# Patient Record
Sex: MALE | Race: BLACK OR AFRICAN AMERICAN | Employment: OTHER | URBAN - METROPOLITAN AREA
[De-identification: names, ages, dates, MRNs, and addresses within clinical notes are randomized per-mention and may not be internally consistent; named-entity substitution may affect disease eponyms.]

---

## 2017-02-21 ENCOUNTER — HOSPITAL ENCOUNTER (EMERGENCY)
Age: 57
Discharge: HOME | End: 2017-02-21
Attending: EMERGENCY MEDICINE | Admitting: EMERGENCY MEDICINE
Payer: OTHER GOVERNMENT

## 2017-02-21 VITALS
WEIGHT: 260 LBS | DIASTOLIC BLOOD PRESSURE: 64 MMHG | BODY MASS INDEX: 37.22 KG/M2 | TEMPERATURE: 97.2 F | HEIGHT: 70 IN | OXYGEN SATURATION: 100 % | SYSTOLIC BLOOD PRESSURE: 132 MMHG | HEART RATE: 57 BPM | RESPIRATION RATE: 20 BRPM

## 2017-02-21 DIAGNOSIS — J01.00 ACUTE MAXILLARY SINUSITIS, RECURRENCE NOT SPECIFIED: Primary | ICD-10-CM

## 2017-02-21 PROCEDURE — 99282 EMERGENCY DEPT VISIT SF MDM: CPT

## 2017-02-21 RX ORDER — FLUTICASONE PROPIONATE 50 MCG
2 SPRAY, SUSPENSION (ML) NASAL DAILY
Qty: 1 BOTTLE | Refills: 0 | Status: SHIPPED | OUTPATIENT
Start: 2017-02-21

## 2017-02-21 RX ORDER — AMOXICILLIN AND CLAVULANATE POTASSIUM 875; 125 MG/1; MG/1
1 TABLET, FILM COATED ORAL 2 TIMES DAILY
Qty: 20 TAB | Refills: 0 | Status: SHIPPED | OUTPATIENT
Start: 2017-02-21 | End: 2017-03-03

## 2017-02-21 RX ORDER — METFORMIN HYDROCHLORIDE 500 MG/1
TABLET ORAL 2 TIMES DAILY WITH MEALS
COMMUNITY

## 2017-02-21 NOTE — DISCHARGE INSTRUCTIONS
Saline Nasal Washes: Care Instructions  Your Care Instructions  Saline nasal washes help keep the nasal passages open by washing out thick or dried mucus. This simple remedy can help relieve symptoms of allergies, sinusitis, and colds. It also can make the nose feel more comfortable by keeping the mucous membranes moist. You may notice a little burning sensation in your nose the first few times you use the solution, but this usually gets better in a few days. Follow-up care is a key part of your treatment and safety. Be sure to make and go to all appointments, and call your doctor if you are having problems. It's also a good idea to know your test results and keep a list of the medicines you take. How can you care for yourself at home? · You can buy premixed saline solution in a squeeze bottle or other sinus rinse products at a drugstore. Read and follow the instructions on the label. · You also can make your own saline solution by adding 1 teaspoon of salt and 1 teaspoon of baking soda to 2 cups of distilled water. · If you use a homemade solution, pour a small amount into a clean bowl. Using a rubber bulb syringe, squeeze the syringe and place the tip in the salt water. Pull a small amount of the salt water into the syringe by relaxing your hand. · Sit down with your head tilted slightly back. Do not lie down. Put the tip of the bulb syringe or the squeeze bottle a little way into one of your nostrils. Gently drip or squirt a few drops into the nostril. Repeat with the other nostril. Some sneezing and gagging are normal at first.  · Gently blow your nose. · Wipe the syringe or bottle tip clean after each use. · Repeat this 2 or 3 times a day. · Use nasal washes gently if you have nosebleeds often. When should you call for help? Watch closely for changes in your health, and be sure to contact your doctor if:  · You often get nosebleeds. · You have problems doing the nasal washes.   Where can you learn more? Go to http://spike-chadwick.info/. Enter [id number] in the search box to learn more about \"Saline Nasal Washes: Care Instructions. \"  Current as of: July 29, 2016  Content Version: 11.1  © 5323-0314 [employer]. Care instructions adapted under license by [employer] (which disclaims liability or warranty for this information). If you have questions about a medical condition or this instruction, always ask your healthcare professional. [address] any warranty or liability for your use of this information. Sinusitis: Care Instructions  Your Care Instructions    Sinusitis is an infection of the lining of the sinus cavities in your head. Sinusitis often follows a cold. It causes pain and pressure in your head and face. In most cases, sinusitis gets better on its own in 1 to 2 weeks. But some mild symptoms may last for several weeks. Sometimes antibiotics are needed. Follow-up care is a key part of your treatment and safety. Be sure to make and go to all appointments, and call your doctor if you are having problems. It's also a good idea to know your test results and keep a list of the medicines you take. How can you care for yourself at home? · Take an over-the-counter pain medicine, such as acetaminophen (Tylenol), ibuprofen (Advil, Motrin), or naproxen (Aleve). Read and follow all instructions on the label. · If the doctor prescribed antibiotics, take them as directed. Do not stop taking them just because you feel better. You need to take the full course of antibiotics. · Be careful when taking over-the-counter cold or flu medicines and Tylenol at the same time. Many of these medicines have acetaminophen, which is Tylenol. Read the labels to make sure that you are not taking more than the recommended dose. Too much acetaminophen (Tylenol) can be harmful.   · Breathe warm, moist air from a steamy shower, a hot bath, or a sink filled with hot water. Avoid cold, dry air. Using a humidifier in your home may help. Follow the directions for cleaning the machine. · Use saline (saltwater) nasal washes to help keep your nasal passages open and wash out mucus and bacteria. You can buy saline nose drops at a grocery store or drugstore. Or you can make your own at home by adding 1 teaspoon of salt and 1 teaspoon of baking soda to 2 cups of distilled water. If you make your own, fill a bulb syringe with the solution, insert the tip into your nostril, and squeeze gently. Lorn Hush your nose. · Put a hot, wet towel or a warm gel pack on your face 3 or 4 times a day for 5 to 10 minutes each time. · Try a decongestant nasal spray like oxymetazoline (Afrin). Do not use it for more than 3 days in a row. Using it for more than 3 days can make your congestion worse. When should you call for help? Call your doctor now or seek immediate medical care if:  · You have new or worse swelling or redness in your face or around your eyes. · You have a new or higher fever. Watch closely for changes in your health, and be sure to contact your doctor if:  · You have new or worse facial pain. · The mucus from your nose becomes thicker (like pus) or has new blood in it. · You are not getting better as expected. Where can you learn more? Go to http://spike-chadwick.info/. Enter G568 in the search box to learn more about \"Sinusitis: Care Instructions. \"  Current as of: July 29, 2016  Content Version: 11.1  © 9408-2577 [employer]. Care instructions adapted under license by [employer] (which disclaims liability or warranty for this information). If you have questions about a medical condition or this instruction, always ask your healthcare professional. [address] any warranty or liability for your use of this information.

## 2017-02-21 NOTE — LETTER
[facility] [place] 
THE [facility] EMERGENCY DEPT 
[address] [id number] [phone] Work/School Note Date: 2/21/2017 To Whom It May concern: 
 
Audrey Velázquez was seen and treated today in the emergency room by the following provider(s): 
Attending Provider: Reyna Schroeder MD 
Physician Assistant: Anoop Mao PA-C. Audrey Velázquez may return to work on 2/22/2017. Sincerely, Tess Reeves PA-C

## 2017-02-21 NOTE — ED PROVIDER NOTES
HPI Comments:   3:06 PM   Eli Theodore is a 59 y.o. male with a hx of DM (recently diagnosed), HTN, HLD, and WALTER (uses CPAP) presenting with wife the ED C/O cold/URI sxs for several days. Associated sxs include nasal congestion/pressure and generalized fatigue/lethargy. Home treatment with Afrin provided no relief of sxs. Pt denies cough, sore throat, and any other Sx or complaints. Patient is a 59 y.o. male presenting with nasal congestion. The history is provided by the patient and the spouse. No  was used. Nasal Congestion   This is a new problem. The current episode started more than 2 days ago. The problem has not changed since onset. Pertinent negatives include no chest pain, no headaches and no shortness of breath.         Past Medical History:   Diagnosis Date    Arthritis     Borderline diabetes     Borderline diabetic     Diabetes ([address]     Diabetes mellitus ([address]     Hiatal hernia     Hypercholesteremia     Hypertension     Plantar fasciitis, bilateral     Sleep apnea     Thyroid disease        Past Surgical History:   Procedure Laterality Date    Hx wrist fracture tx      Hx ankle fracture tx Left     Hx knee replacement Right          Family History:   Problem Relation Age of Onset    Diabetes Mother     Glaucoma Mother     Heart Disease Mother     Heart Disease Father     Cancer Sister     Cancer Brother      breast    Kidney Disease Brother     Diabetes Brother     Diabetes Sister     Pneumonia Sister        Social History     Social History    Marital status:      Spouse name: N/A    Number of children: 6    Years of education: 12     Occupational History     Other     Social History Main Topics    Smoking status: Former Smoker     Packs/day: 0.50     Years: 5.00     Types: Cigarettes    Smokeless tobacco: Former User     Types: Chew     Quit date: 1/1/1992    Alcohol use 0.0 oz/week     0 Standard drinks or equivalent per week    Drug use: No    Sexual activity: Yes     Partners: Female     Other Topics Concern     Service Yes      [employer]    Blood Transfusions No    Caffeine Concern No    Occupational Exposure No    Hobby Hazards Yes     Racquetball    Sleep Concern Yes     CPAP    Stress Concern No    Weight Concern Yes    Special Diet No    Back Care Yes     wears a brace as needed    Exercise Yes     walking    Bike Helmet No    Seat Belt Yes    Self-Exams No     Social History Narrative         ALLERGIES: Review of patient's allergies indicates no known allergies. Review of Systems   Constitutional: Positive for fatigue. Negative for chills and fever. HENT: Positive for congestion and sinus pressure. Negative for sore throat. Respiratory: Negative for cough and shortness of breath. Cardiovascular: Negative for chest pain. Gastrointestinal: Negative for nausea and vomiting. Musculoskeletal: Negative for myalgias. Skin: Negative for rash. Allergic/Immunologic: Negative for immunocompromised state. Neurological: Negative for dizziness, light-headedness and headaches. Hematological: Negative for adenopathy. Vitals:    02/21/17 1402   BP: 132/64   Pulse: (!) 57   Resp: 20   Temp: 97.2 °F (36.2 °C)   SpO2: 100%   Weight: 117.9 kg (260 lb)   Height: 5' 10\" (1.778 m)            Physical Exam   Constitutional: He is oriented to person, place, and time. He appears well-developed and well-nourished. No distress. Male in NAD. Alert. Appears comfortable. HENT:   Head: Normocephalic and atraumatic. Head is without right periorbital erythema and without left periorbital erythema. Right Ear: External ear normal. No drainage or swelling. Tympanic membrane is not perforated, not erythematous and not bulging. Left Ear: External ear normal. No drainage or swelling. Tympanic membrane is not perforated, not erythematous and not bulging. Nose: Mucosal edema and rhinorrhea present.  Right sinus exhibits maxillary sinus tenderness and frontal sinus tenderness. Left sinus exhibits maxillary sinus tenderness and frontal sinus tenderness. Mouth/Throat: Uvula is midline, oropharynx is clear and moist and mucous membranes are normal. No oral lesions. No trismus in the jaw. No dental abscesses or uvula swelling. No oropharyngeal exudate, posterior oropharyngeal edema, posterior oropharyngeal erythema or tonsillar abscesses. Eyes: Conjunctivae are normal.   Neck: Normal range of motion. Neck supple. Cardiovascular: Normal rate, regular rhythm, normal heart sounds and intact distal pulses. Exam reveals no gallop and no friction rub. No murmur heard. Pulmonary/Chest: Effort normal and breath sounds normal. No accessory muscle usage. No tachypnea. No respiratory distress. He has no decreased breath sounds. He has no wheezes. He has no rhonchi. He has no rales. Abdominal: Soft. Bowel sounds are normal. He exhibits no distension. There is no tenderness. Musculoskeletal: Normal range of motion. Lymphadenopathy:     He has no cervical adenopathy. Neurological: He is alert and oriented to person, place, and time. Skin: Skin is warm and dry. He is not diaphoretic. Psychiatric: He has a normal mood and affect. Judgment normal.   Nursing note and vitals reviewed. RESULTS:    No orders to display        Labs Reviewed - No data to display    No results found for this or any previous visit (from the past 12 hour(s)). MDM  Number of Diagnoses or Management Options  Acute maxillary sinusitis, recurrence not specified:   Diagnosis management comments: URI, strep, sinusitis, mono, PNA, allergic, bronchitis, influenza       Amount and/or Complexity of Data Reviewed  Obtain history from someone other than the patient: yes (Wife)      ED Course     MEDICATIONS GIVEN:  Medications - No data to display     Procedures     PROGRESS NOTE:   3:06 PM  Initial assessment performed.   Written by RUTH Aldanaibrachel, as dictated by Navdeep Wick PA-C      Afebrile. Will tx for bacterial sinusitis. Flonase. Netti pot with distilled water reviewed. FU with PCP. Reasons to RTED discussed with pt. All questions answered. Pt feels comfortable going home at this time. Pt expressed understanding and he agrees with plan. DISCHARGE NOTE:  3:26 PM   Susan [place]  results have been reviewed with him. He has been counseled regarding his diagnosis, treatment, and plan. He verbally conveys understanding and agreement of the signs, symptoms, diagnosis, treatment and prognosis and additionally agrees to follow up as discussed. He also agrees with the care-plan and conveys that all of his questions have been answered. I have also provided discharge instructions for him that include: educational information regarding their diagnosis and treatment, and list of reasons why they would want to return to the ED prior to their follow-up appointment, should his condition change. The patient and/or family have been provided with education for proper Emergency Department utilization. CLINICAL IMPRESSION:    1. Acute maxillary sinusitis, recurrence not specified        AFTER VISIT PLAN:    Discharge Medication List as of 2/21/2017  3:27 PM      START taking these medications    Details   amoxicillin-clavulanate (AUGMENTIN) 875-125 mg per tablet Take 1 Tab by mouth two (2) times a day for 10 days. Take with daily yogurt or probiotic. Take with food. Indications: ACUTE BACTERIAL SINUSITIS, Print, Disp-20 Tab, R-0      fluticasone (FLONASE) 50 mcg/actuation nasal spray 2 Sprays by Both Nostrils route daily. , Print, Disp-1 Bottle, R-0         CONTINUE these medications which have NOT CHANGED    Details   metFORMIN (GLUCOPHAGE) 500 mg tablet Take  by mouth two (2) times daily (with meals). , Historical Med      !! cpap machine kit by Does Not Apply route. CPAP at 17 cm with humidifier. Mask- Mirage Quattro full face, medium. Length of need 99 months. Replace mask and accessories as needed times 12 months. Download data at 30 days and fax at [phone]. Dx- WALTER on CPAP, Snoring,  Hypersomnolence, Print, Disp-1 Kit, R-0      !! budesonide-formoterol (SYMBICORT) 80-4.5 mcg/actuation HFAA inhaler Take 2 Puffs by inhalation two (2) times a day., Sample, Disp-1 Inhaler, R-0      !! budesonide-formoterol (SYMBICORT) 80-4.5 mcg/actuation HFAA inhaler Take 2 Puffs by inhalation two (2) times a day., Print, Disp-1 Inhaler, R-5      SIMVASTATIN PO Take 145 mg by mouth daily. , Historical Med      inhalational spacing device 1 Each by Does Not Apply route as needed. Please dispense one adult spacer to be used with albuterol HFA. , Print, Disp-1 Device, R-0      !! cpap machine kit by Does Not Apply route nightly., Historical Med      esomeprazole (NEXIUM) 20 mg capsule Take  by mouth daily. , Historical Med      CELECOXIB (CELEBREX PO) Take  by mouth., Historical Med       !! - Potential duplicate medications found. Please discuss with provider. Follow-up Information     Follow up With Details Comments Contact Info    Yoli Branch MD  As needed, If symptoms worsen [facility] [phone]      THE [facility] EMERGENCY DEPT  As needed, If symptoms worsen [address] [place] [phone]  [phone]             Attestations: This note is prepared by Nicolas Stevens, acting as Scribe for CurrentlyNEVILLE. CurrentlyNEVILLE: The scribe's documentation has been prepared under my direction and personally reviewed by me in its entirety. I confirm that the note above accurately reflects all work, treatment, procedures, and medical decision making performed by me.

## 2017-02-21 NOTE — ED NOTES
I have reviewed discharge instructions with the patient. The patient verbalized understanding. Patient armband removed and shredded  Reviewed and verified discharge instructions with patient, prescriptions in hand for Augmentin and Afrin nasal spray and work note for patient and wife. Discharged ambulatory on steady gait in NAD.

## 2017-02-21 NOTE — ED NOTES
Nasal congestion with pain behind his eyes for approximately one week, using OTC Afrin nasal spray with some relief, symptoms return, also complains of crusty like drainage from his eyes.

## 2017-02-21 NOTE — LETTER
[facility] [place] 
THE [facility] EMERGENCY DEPT 
[address] [id number] [phone] Work/School Note Date: 2/21/2017 To Whom It May concern: 
 
Diana Kasper was seen and treated today in the emergency room by the following provider(s): 
Attending Provider: Sly Hayes MD 
Physician Assistant: Jenifer Rivera PA-C. Jordyn Sawant wife may return to work on 2/22/2017. Sincerely, Ferny Contreras PA-C

## 2017-06-22 ENCOUNTER — HOSPITAL ENCOUNTER (OUTPATIENT)
Dept: PHYSICAL THERAPY | Age: 57
Discharge: HOME | End: 2017-06-22
Payer: OTHER GOVERNMENT

## 2017-06-22 PROCEDURE — 97162 PT EVAL MOD COMPLEX 30 MIN: CPT

## 2017-06-22 PROCEDURE — 97530 THERAPEUTIC ACTIVITIES: CPT

## 2017-06-22 NOTE — PROGRESS NOTES
PT DAILY TREATMENT NOTE     Patient Name: Claudell Dole  Date:2017  : 1960  [x]  Patient  Verified  Payor:  / Plan: [facility]  RETIREES AND DEPENDENTS / Product Type:  /    In time:3:25 pm  Out time: 4:05 pm  Total Treatment Time (min): 40  Visit #: 1 of 12    Treatment Area: Cervicalgia [M54.2]    SUBJECTIVE  Pain Level (0-10 scale): 5  Any medication changes, allergies to medications, adverse drug reactions, diagnosis change, or new procedure performed?: [x] No    [] Yes (see summary sheet for update)  Subjective functional status/changes:   [] No changes reported    Hx Present Illness: \"I have been having these pains in my neck for about over a year. I don't know how or why. \"  insidious in onset  Initially thought due to how was sleeping  Tried going to chiropractor - with only short lived improvements  Unsure if related to hitting head on forkift at work   Intermittent numbness/tingling and decreased  strength in Left UE  Indicates numbness in digits 2-3  Increase in numbness and tingling with lifting  Recently dx with Type II DM  X-rays - pt unaware of results  Reports previously frequent HA, has subsided in last several months  Increase in pain with turning head, sitting, lifting, daily activities, disturbing sleep at night         Pain:  _7__/10 max       __4_/10 min     _5-6___/10 now    Location: indicates c-spine, periscapular area to subocciptal region      [x] Sharp    [] Dull      [] Burning     []  Aching     [] Throbbing      [] Tingling     [] Other:       [x]  Constant                   [] Intermittent        Previous treatment:   Chiropractor    PMHX: Significant for: Please see past medical hx form     Social/Recreation/Work: warehouse- requires lifting  [employer]   [employer]     Patient Goal(s): \"Would be nice to get rid of the pain,but alleviate the pain. ROM I guess. \"      OBJECTIVE    Modality rationale:    Min Type Additional Details    [] Estim: []Unatt       []IFC  []Premod                        []Other:  []w/ice   []w/heat  Position:  Location:    [] Estim: []Att    []TENS instruct  []NMES                    []Other:  []w/US   []w/ice   []w/heat  Position:  Location:    []  Traction: [] Cervical       []Lumbar                       [] Prone          []Supine                       []Intermittent   []Continuous Lbs:  [] before manual  [] after manual    []  Ultrasound: []Continuous   [] Pulsed                           []1MHz   []3MHz W/cm2:  Location:    []  Iontophoresis with dexamethasone         Location: [] Take home patch   [] In clinic    []  Ice     []  heat  []  Ice massage  []  Laser   []  Anodyne Position:  Location:    []  Laser with stim  []  Other:  Position:  Location:    []  Vasopneumatic Device Pressure:       [] lo [] med [] hi   Temperature: [] lo [] med [] hi   [] Skin assessment post-treatment:  []intact []redness- no adverse reaction    []redness - adverse reaction:     25 min [x]Eval                  []Re-Eval             With   [] TE   [x] TA - 15 min    [] neuro   [] other: Patient Education: [x] Review HEP    [] Progressed/Changed HEP based on:   [] positioning   [] body mechanics   [] transfers   [] heat/ice application    [x] other: reviewed exam findings, anatomy involved, POC     Other Objective/Functional Measures: Movement/gait:      Visual Inspection: Thoracic: flattened  Lumbar: increased   Shoulder/Scapula: right shoulder and scap lower, bilateral scap abducted   Forward head, rounded shoulders     Palpation: restricted cervical mobility, especially with sideglides to the left  Decreased thoracic and rib mobility  pec and lat stiffness bilaterally   TTP and active trigger points in bilateraly UT, cervical paraspinals.   Decreased OA extension                              AROM/PROM Right Left   Cervical Flex: 31     Ext: 29     Rot 35 34   Lat Flex 24 23               Strength Right Left   UQS 4+ 4+        Middle trap 4 4-   Lower trap 4 4-                 Special Tests Right Left   Spurlings - -   Trunk Rot 19.5 in 19.5 in                          Other Right Left                                       Other Comments: Standing Forward Flexion fingers to floor, dcr reversal of lordosis, decreased use of gluts to stand  Gillet: hypomobility  Scapular Rhythm: slight dyskinesia, superior translation of bilateral humeral heads    Pain Level (0-10 scale) post treatment: 4    ASSESSMENT/Changes in Function:   Pt is a 59 y.o. Male with C/C of cervical pain, insidious in onset ~1 yr ago. Suspect that cervical pain is non-radicular in nature. Pt presents with forward head posture and rounded shoulders. Other objective findings include decreased cervical AROM, decreased cervical and thoracic mobility, decreased trunk rotation and decreased scapular strength. Patient will continue to benefit from skilled PT services to modify and progress therapeutic interventions, address functional mobility deficits, address ROM deficits, address strength deficits, analyze and address soft tissue restrictions, analyze and cue movement patterns, analyze and modify body mechanics/ergonomics, assess and modify postural abnormalities and instruct in home and community integration to attain remaining goals. []  See Plan of Care  []  See progress note/recertification  []  See Discharge Summary         Progress towards goals / Updated goals:  Short Term Goals: STG- To be accomplished in 2 week(s):  1. Pt will be independent with HEP to encourage prophylaxis. Eval:dispensed 2 exercises   Current: NA    Long Term Goals: LTG- To be accomplished in 6 week(s):  1. Pt will increase cervical AROM to [facility] facilitate safe driving. Eval:  AROM/PROM Right Left   Cervical Flex: 31     Ext: 29     Rot 35 34   Lat Flex 24 23     Current: NA    2. Pt will be able to lift and reach overhead at work without pain.   Eval:pain with repeated lifting and reaching at work  Current: NA    3. Pt increase trunk rotation to 16 in or less to improve mobility for increased tolerance to ADLs. Eval:19.5 in  Current: NA    4. Pt FOTO score will increase by 10 points to show improvement in function. Eval:47  Current: will address at visit 5      PLAN  [x]  Upgrade activities as tolerated     []  Continue plan of care  []  Update interventions per flow sheet       []  Discharge due to:_  []  Other:_      Bianca Hearn, PT, DPT 6/22/2017  3:23 PM    No future appointments.

## 2017-06-22 NOTE — PROGRESS NOTES
[facility] at the [address], [place], [address] [facility]  Phone: [phone]      Fax:  [phone]       Plan of Care/ Statement of Necessity for Physical Therapy Services      Patient name: Edy Mcclain Start of Care: 2017   Referral source: Dakota Mercer DO : 1960    Medical Diagnosis: Cervicalgia [M54.2]   Onset Date:~1 yr ago    Treatment Diagnosis: Cervical Pain    Prior Hospitalization: see medical history Provider#: [id number]   Medications: Verified on Patient summary List    Comorbidities: Back Pain, BMI >30, DM, Sleep Dysfunction    Prior Level of Function: Increase in pain with turning head, sitting, lifting, daily activities, disturbing sleep at night        The Plan of Care and following information is based on the information from the initial evaluation. Assessment/ key information:   Pt is a 59 y.o. Male with C/C of cervical pain, insidious in onset ~1 yr ago. Suspect that cervical pain is non-radicular in nature. Pt presents with forward head posture and rounded shoulders. Other objective findings include decreased cervical AROM, decreased cervical and thoracic mobility, decreased trunk rotation and decreased scapular strength. Evaluation Complexity History HIGH Complexity :3+ comorbidities / personal factors will impact the outcome/ POC ; Examination HIGH Complexity : 4+ Standardized tests and measures addressing body structure, function, activity limitation and / or participation in recreation  ;Presentation MEDIUM Complexity : Evolving with changing characteristics  ; Clinical Decision Making MEDIUM Complexity : FOTO score of 26-74  Overall Complexity Rating: MEDIUM  Problem List: pain affecting function, decrease ROM, decrease strength, impaired gait/ balance, decrease ADL/ functional abilitiies, decrease activity tolerance and decrease flexibility/ joint mobility   Treatment Plan may include any combination of the following: Therapeutic exercise, Therapeutic activities, Neuromuscular re-education, Physical agent/modality, Gait/balance training, Manual therapy and Patient education  Patient / Family readiness to learn indicated by: asking questions, trying to perform skills and interest  Persons(s) to be included in education: patient (P)  Barriers to Learning/Limitations: None  Patient Goal (s): Would be nice to get rid of the pain,but alleviate the pain. ROM I guess  Patient Self Reported Health Status: fair  Rehabilitation Potential: good    Short Term Goals: STG- To be accomplished in 2 week(s):  1. Pt will be independent with HEP to encourage prophylaxis. Eval:dispensed 2 exercises   Current: NA     Long Term Goals: LTG- To be accomplished in 6 week(s):  1. Pt will increase cervical AROM to [facility] facilitate safe driving. Eval:  AROM/PROM Right Left   Cervical Flex: 31       Ext: 29       Rot 35 34   Lat Flex 24 23      Current: NA     2.  Pt will be able to lift and reach overhead at work without pain. Eval:pain with repeated lifting and reaching at work  Current: NA     3.  Pt increase trunk rotation to 16 in or less to improve mobility for increased tolerance to ADLs. Eval:19.5 in  Current: NA     4. Pt FOTO score will increase by 10 points to show improvement in function. Eval:47  Current: will address at visit 5    Frequency / Duration: Patient to be seen 2 times per week for 6 weeks. Patient/ Caregiver education and instruction: Diagnosis, prognosis, self care, activity modification and exercises   [x]  Plan of care has been reviewed with JAIDEN Hearn, PT, DPT 6/22/2017 6:59 PM  _____________________________________________________________________  I certify that the above Therapy Services are being furnished while the patient is under my care. I agree with the treatment plan and certify that this therapy is necessary.     Physician's Signature:____________________ Date:__________Time:______    Please sign and return to [facility] at the [address], [place], [address] [facility]       Phone: [phone]      Fax:  [phone]

## 2017-07-05 ENCOUNTER — HOSPITAL ENCOUNTER (OUTPATIENT)
Dept: PHYSICAL THERAPY | Age: 57
Discharge: HOME | End: 2017-07-05
Payer: OTHER GOVERNMENT

## 2017-07-05 PROCEDURE — 97110 THERAPEUTIC EXERCISES: CPT

## 2017-07-05 PROCEDURE — 97140 MANUAL THERAPY 1/> REGIONS: CPT

## 2017-07-05 NOTE — PROGRESS NOTES
PT DAILY TREATMENT NOTE     Patient Name: Leena Herman  Date:2017  : 1960  [x]  Patient  Verified  Payor:  / Plan: [facility]  RETIREES AND DEPENDENTS / Product Type: [employer] /    In time:5:29  Out time:6:29  Total Treatment Time (min): 60  Visit #: 2 of 12    Treatment Area: Cervicalgia [M54.2]    SUBJECTIVE  Pain Level (0-10 scale): 10  Any medication changes, allergies to medications, adverse drug reactions, diagnosis change, or new procedure performed?: [x] No    [] Yes (see summary sheet for update)  Subjective functional status/changes:   [] No changes reported  \"I get HAs daily. \"    OBJECTIVE    Modality rationale: decrease pain to improve the patients ability to tolerate positions and ADLs.    Min Type Additional Details    [] Estim:  []Unatt       []IFC  []Premod                        []Other:  []w/ice   []w/heat  Position:  Location:    [] Estim: []Att    []TENS instruct  []NMES                    []Other:  []w/US   []w/ice   []w/heat  Position:  Location:    []  Traction: [] Cervical       []Lumbar                       [] Prone          []Supine                       []Intermittent   []Continuous Lbs:  [] before manual  [] after manual    []  Ultrasound: []Continuous   [] Pulsed                           []1MHz   []3MHz W/cm2:  Location:    []  Iontophoresis with dexamethasone         Location: [] Take home patch   [] In clinic   10 []  Ice     [x]  heat  []  Ice massage  []  Laser   []  Anodyne Position: supine  Location: applied to neck    []  Laser with stim  []  Other:  Position:  Location:    []  Vasopneumatic Device Pressure:       [] lo [] med [] hi   Temperature: [] lo [] med [] hi   [] Skin assessment post-treatment:  []intact []redness- no adverse reaction    []redness - adverse reaction:      min []Eval                  []Re-Eval       31 min Therapeutic Exercise:  [x] See flow sheet :  Added pec stretches, UBE (level 1), standing latissimus stretches (GUERRERO), added quadriped serratus anterior punches    Rationale: increase ROM, increase strength, improve coordination and increase proprioception to improve the patients ability to tolerate positions and normalize ADLs     min Therapeutic Activity:  []  See flow sheet :         min Neuromuscular Re-education:  []  See flow sheet :       19 min Manual Therapy:    Trigger point release B neck paraspinals, B UT, B levator scapula; suboccipital release   Rationale: decrease pain, increase ROM, increase tissue extensibility and decrease trigger points to move head to drive safely. min Gait Training:  ___ feet with ___ device on level surfaces with ___ level of assist   Rationale: With   [] TE   [] TA   [] neuro   [] other: Patient Education: [x] Review HEP    [] Progressed/Changed HEP based on:   [] positioning   [] body mechanics   [] transfers   [] heat/ice application    [] other:      Other Objective/Functional Measures:   Trigger point release B neck paraspinals, B UT, B levator scapula  1-2+TTP B suboccipital region     Pain Level (0-10 scale) post treatment: 4/10    ASSESSMENT/Changes in Function:    Trigger points slowly resolving in the areas treated today. No change in reported pain today, but pt reported improved AROM and less stiffness in the neck. Patient will continue to benefit from skilled PT services to modify and progress therapeutic interventions, address functional mobility deficits, address ROM deficits, address strength deficits, analyze and address soft tissue restrictions, analyze and cue movement patterns, analyze and modify body mechanics/ergonomics, assess and modify postural abnormalities and instruct in home and community integration to attain remaining goals. []  See Plan of Care  []  See progress note/recertification  []  See Discharge Summary         Progress towards goals / Updated goals:  Short Term Goals: STG- To be accomplished in 2 week(s):  1.   Pt will be independent with HEP to encourage prophylaxis. Eval:dispensed 2 exercises   Current: NA     Long Term Goals: LTG- To be accomplished in 6 week(s):  1. Pt will increase cervical AROM to Health system facilitate safe driving. Eval:  AROM/PROM Right Left   Cervical Flex: 31       Ext: 29       Rot 35 34   Lat Flex 24 23      Current: NA     2.  Pt will be able to lift and reach overhead at work without pain. Eval:pain with repeated lifting and reaching at work  Current: NA     3.  Pt increase trunk rotation to 16 in or less to improve mobility for increased tolerance to ADLs. Eval:19.5 in  Current: NA     4. Pt FOTO score will increase by 10 points to show improvement in function.   Eval:47  Current: will address at visit 5    PLAN  []  Upgrade activities as tolerated     []  Continue plan of care  []  Update interventions per flow sheet       []  Discharge due to:_  []  Other:_      Sil Fletcher, PT 7/5/2017  5:38 PM    Future Appointments  Date Time Provider [place]   7/6/2017 5:00 PM Travis Cowan, PT, DPT [facility]

## 2017-07-06 ENCOUNTER — HOSPITAL ENCOUNTER (OUTPATIENT)
Dept: PHYSICAL THERAPY | Age: 57
Discharge: HOME | End: 2017-07-06
Payer: OTHER GOVERNMENT

## 2017-07-06 PROCEDURE — 97110 THERAPEUTIC EXERCISES: CPT

## 2017-07-06 PROCEDURE — 97140 MANUAL THERAPY 1/> REGIONS: CPT

## 2017-07-06 NOTE — PROGRESS NOTES
PT DAILY TREATMENT NOTE     Patient Name: Cinthya Hernandez  Date:2017  : 1960  [x]  Patient  Verified  Payor:  / Plan: [facility]  RETIREES AND DEPENDENTS / Product Type:  /    In time: 5:05 pm  Out time:5:07 pm  Total Treatment Time (min): 58  Visit #: 3 of 12    Treatment Area: Cervicalgia [M54.2]    SUBJECTIVE  Pain Level (0-10 scale): 4  Any medication changes, allergies to medications, adverse drug reactions, diagnosis change, or new procedure performed?: [x] No    [] Yes (see summary sheet for update)  Subjective functional status/changes:   [] No changes reported  \"I felt like I could move my head a little bit more. \"    OBJECTIVE    50 min Therapeutic Exercise:  [x] See flow sheet :   Rationale: increase ROM, increase strength, improve coordination and increase proprioception to improve the patients ability to perform daily activities with decreased pain and symptom levels    12 min Manual Therapy:  STM to bilateral UT and cervical paraspinals  Manual cervical distraction and SOR   Rationale: decrease pain, increase ROM, increase tissue extensibility and decrease trigger points to perform daily activities with decreased pain and symptom levels            With   [] TE   [] TA   [] neuro   [] other: Patient Education: [x] Review HEP    [] Progressed/Changed HEP based on:   [] positioning   [] body mechanics   [] transfers   [] heat/ice application    [] other:      Other Objective/Functional Measures:   Reported improved mobility at end of session      Pain Level (0-10 scale) post treatment: 4-5 \"hurts but feels good at the same time. \"    ASSESSMENT/Changes in Function:   Pt tolerated treatment session well. Reported improved mobility at end of session. Corrected form with lat stretch.      Patient will continue to benefit from skilled PT services to modify and progress therapeutic interventions, address functional mobility deficits, address ROM deficits, address strength deficits, analyze and address soft tissue restrictions, analyze and cue movement patterns, analyze and modify body mechanics/ergonomics, assess and modify postural abnormalities and instruct in home and community integration to attain remaining goals. []  See Plan of Care  []  See progress note/recertification  []  See Discharge Summary         Progress towards goals / Updated goals:  Short Term Goals: STG- To be accomplished in 2 week(s):  1.  Pt will be independent with HEP to encourage prophylaxis. Eval:dispensed 2 exercises   Current: updated this session      Long Term Goals: LTG- To be accomplished in 6 week(s):  1.  Pt will increase cervical AROM to [facility] facilitate safe driving. Eval:  AROM/PROM Right Left   Cervical Flex: 31         Ext: 29         Rot 35 34   Lat Flex 24 23       Current: NA      2.  Pt will be able to lift and reach overhead at work without pain. Eval:pain with repeated lifting and reaching at work  Current: NA      3.  Pt increase trunk rotation to 16 in or less to improve mobility for increased tolerance to ADLs. Eval:19.5 in  Current: NA      4.  Pt FOTO score will increase by 10 points to show improvement in function. Eval:47  Current: will address at visit 5      PLAN  [x]  Upgrade activities as tolerated     []  Continue plan of care  []  Update interventions per flow sheet       []  Discharge due to:_  []  Other:_      Serafin Danielson, PT, DPT 7/6/2017  5:07 PM    No future appointments.

## 2017-08-08 ENCOUNTER — APPOINTMENT (OUTPATIENT)
Dept: PHYSICAL THERAPY | Age: 57
End: 2017-08-08

## 2019-12-03 ENCOUNTER — APPOINTMENT (OUTPATIENT)
Dept: GENERAL RADIOLOGY | Age: 59
End: 2019-12-03
Attending: PHYSICIAN ASSISTANT
Payer: OTHER GOVERNMENT

## 2019-12-03 ENCOUNTER — HOSPITAL ENCOUNTER (EMERGENCY)
Age: 59
Discharge: HOME | End: 2019-12-03
Attending: EMERGENCY MEDICINE
Payer: OTHER GOVERNMENT

## 2019-12-03 VITALS
RESPIRATION RATE: 20 BRPM | HEIGHT: 70 IN | HEART RATE: 76 BPM | TEMPERATURE: 97.5 F | OXYGEN SATURATION: 100 % | DIASTOLIC BLOOD PRESSURE: 84 MMHG | SYSTOLIC BLOOD PRESSURE: 138 MMHG | BODY MASS INDEX: 39.37 KG/M2 | WEIGHT: 275 LBS

## 2019-12-03 DIAGNOSIS — Z72.0 TOBACCO USE: ICD-10-CM

## 2019-12-03 DIAGNOSIS — J20.9 ACUTE BRONCHITIS WITH BRONCHOSPASM: Primary | ICD-10-CM

## 2019-12-03 LAB — S PYO AG THROAT QL: NEGATIVE

## 2019-12-03 PROCEDURE — 87070 CULTURE OTHR SPECIMN AEROBIC: CPT

## 2019-12-03 PROCEDURE — 99283 EMERGENCY DEPT VISIT LOW MDM: CPT

## 2019-12-03 PROCEDURE — 71046 X-RAY EXAM CHEST 2 VIEWS: CPT

## 2019-12-03 PROCEDURE — 87880 STREP A ASSAY W/OPTIC: CPT

## 2019-12-03 RX ORDER — ALBUTEROL SULFATE 90 UG/1
2 AEROSOL, METERED RESPIRATORY (INHALATION)
Qty: 1 INHALER | Refills: 1 | Status: SHIPPED | OUTPATIENT
Start: 2019-12-03

## 2019-12-03 RX ORDER — BENZONATATE 100 MG/1
100 CAPSULE ORAL
Qty: 21 CAP | Refills: 0 | Status: SHIPPED | OUTPATIENT
Start: 2019-12-03 | End: 2019-12-10

## 2019-12-03 RX ORDER — CODEINE PHOSPHATE AND GUAIFENESIN 10; 100 MG/5ML; MG/5ML
5 SOLUTION ORAL
Qty: 160 ML | Refills: 0 | Status: SHIPPED | OUTPATIENT
Start: 2019-12-03 | End: 2019-12-08

## 2019-12-03 NOTE — ED PROVIDER NOTES
EMERGENCY DEPARTMENT HISTORY AND PHYSICAL EXAM    Date: 12/3/2019  Patient Name: Francisco J Cobb    History of Presenting Illness     Chief Complaint   Patient presents with    Nasal Congestion    Cough         History Provided By: Patient    Chief Complaint: cough    HPI(Context):   9:22 AM  Francisco J Cobb is a 62 y.o. male with PMHX of OA, DMII, HTN, WALTER who presents to the emergency department C/O cough. Associated sxs include congestion and sore throat. + strep exposure. Mildly productive cough. Pt is nonsmoker. No hx of asthma or COPD. No relief with OTC meds. Pt denies fever, chills, chest pain, SOB, wheezing, and any other sxs or complaints. Pt is intermittent tobacco smoker. PCP: [place], MD [place]    Current Outpatient Medications   Medication Sig Dispense Refill    guaiFENesin-codeine (ROBITUSSIN AC) 100-10 mg/5 mL solution Take 5 mL by mouth three (3) times daily as needed for Cough for up to 5 days. Max Daily Amount: 15 mL. 160 mL 0    benzonatate (TESSALON PERLES) 100 mg capsule Take 1 Cap by mouth three (3) times daily as needed for Cough for up to 7 days. 21 Cap 0    loratadine-pseudoephedrine (CLARITIN-D 12 HOUR) 5-120 mg per tablet Take 1 Tab by mouth two (2) times a day. For nasal congestion 20 Tab 0    inhalational spacing device 1 Each by Does Not Apply route as needed (to be used with albuterol HFA.). Please dispense one adult spacer 1 Device 0    albuterol (PROVENTIL HFA, VENTOLIN HFA, PROAIR HFA) 90 mcg/actuation inhaler Take 2 Puffs by inhalation every four (4) hours as needed for Wheezing or Shortness of Breath. 1 Inhaler 1    metFORMIN (GLUCOPHAGE) 500 mg tablet Take  by mouth two (2) times daily (with meals).  fluticasone (FLONASE) 50 mcg/actuation nasal spray 2 Sprays by Both Nostrils route daily. 1 Bottle 0    cpap machine kit by Does Not Apply route. CPAP at 17 cm with humidifier. Mask- Mirage Quattro full face, medium. Length of need 99 months.  Replace mask and accessories as needed times 12 months. Download data at 30 days and fax at [phone]. Dx- WALTER on CPAP, Snoring, Hypersomnolence 1 Kit 0    budesonide-formoterol (SYMBICORT) 80-4.5 mcg/actuation HFAA inhaler Take 2 Puffs by inhalation two (2) times a day. 1 Inhaler 0    budesonide-formoterol (SYMBICORT) 80-4.5 mcg/actuation HFAA inhaler Take 2 Puffs by inhalation two (2) times a day. 1 Inhaler 5    SIMVASTATIN PO Take 145 mg by mouth daily.  cpap machine kit by Does Not Apply route nightly.  esomeprazole (NEXIUM) 20 mg capsule Take  by mouth daily.  CELECOXIB (CELEBREX PO) Take  by mouth. Past History     Past Medical History:  Past Medical History:   Diagnosis Date    Arthritis     Borderline diabetes     Borderline diabetic     Diabetes ([address]     Diabetes mellitus [address] [address]     Hiatal hernia     Hypercholesteremia     Hypertension     Plantar fasciitis, bilateral     Sleep apnea     Thyroid disease        Past Surgical History:  Past Surgical History:   Procedure Laterality Date    HX ANKLE FRACTURE TX Left     HX KNEE REPLACEMENT Right     HX WRIST FRACTURE TX         Family History:  Family History   Problem Relation Age of Onset   [address] [facility] Diabetes Mother    [address] [facility] Glaucoma Mother     Heart Disease Mother     Heart Disease Father     Cancer Sister     Cancer Brother         breast    Kidney Disease Brother     Diabetes Brother     Diabetes Sister     Pneumonia Sister        Social History:  Social History     Tobacco Use    Smoking status: Former Smoker     Packs/day: 0.50     Years: 5.00     Pack years: 2.50     Types: Cigarettes    Smokeless tobacco: Former User     Types: Chew     Quit date: 1/1/1992   Substance Use Topics    Alcohol use: Yes     Alcohol/week: 0.0 standard drinks    Drug use: No       Allergies:  No Known Allergies      Review of Systems   Review of Systems   Constitutional: Negative for chills and fever. HENT: Positive for congestion, sinus pressure and sore throat. Respiratory: Positive for cough. Negative for shortness of breath. Cardiovascular: Negative for chest pain. Skin: Negative for rash. Allergic/Immunologic: Negative for immunocompromised state. All other systems reviewed and are negative. Physical Exam     Vitals:    12/03/19 0913   BP: 138/84   Pulse: 76   Resp: 20   Temp: 97.5 °F (36.4 °C)   SpO2: 100%   Weight: 124.7 kg (275 lb)   Height: 5' 10\" (1.778 m)     Physical Exam  Vitals signs and nursing note reviewed. Constitutional:       General: He is not in acute distress. Appearance: He is well-developed. He is not diaphoretic. Comments: AA female in NAD. Alert. Appears comfortable. HENT:      Head: Normocephalic and atraumatic. No right periorbital erythema or left periorbital erythema. Jaw: No trismus. Right Ear: External ear normal. No drainage or swelling. Tympanic membrane is not perforated, erythematous or bulging. Left Ear: External ear normal. No drainage or swelling. Tympanic membrane is not perforated, erythematous or bulging. Nose: Mucosal edema present. No rhinorrhea. Right Sinus: No maxillary sinus tenderness or frontal sinus tenderness. Left Sinus: No maxillary sinus tenderness or frontal sinus tenderness. Mouth/Throat:      Mouth: No oral lesions. Dentition: No dental abscesses. Pharynx: Uvula midline. Posterior oropharyngeal erythema present. No oropharyngeal exudate or uvula swelling. Tonsils: No tonsillar abscesses. Eyes:      General: No scleral icterus. Right eye: No discharge. Left eye: No discharge. Conjunctiva/sclera: Conjunctivae normal.   Neck:      Musculoskeletal: Normal range of motion and neck supple. Cardiovascular:      Rate and Rhythm: Normal rate and regular rhythm. Heart sounds: Normal heart sounds. No murmur. No friction rub. No gallop.     Pulmonary:      Effort: Pulmonary effort is normal. No tachypnea, accessory muscle usage or respiratory distress. Breath sounds: Normal breath sounds. No decreased breath sounds, wheezing, rhonchi or rales. Abdominal:      Palpations: Abdomen is soft. Musculoskeletal: Normal range of motion. Lymphadenopathy:      Cervical: No cervical adenopathy. Skin:     General: Skin is warm and dry. Neurological:      Mental Status: He is alert and oriented to person, place, and time. Psychiatric:         Judgment: Judgment normal.             Diagnostic Study Results     Labs -   No results found for this or any previous visit (from the past 12 hour(s)). XR CHEST PA LAT   Final Result   IMPRESSION:         1. No acute radiographic cardiopulmonary abnormality. CT Results  (Last 48 hours)    None        CXR Results  (Last 48 hours)               12/03/19 0956  XR CHEST PA LAT Final result    Impression:  IMPRESSION:           1. No acute radiographic cardiopulmonary abnormality. Narrative:  EXAM: XR CHEST PA LAT       CLINICAL INDICATION/HISTORY: cough   -Additional: Nasal congestion with cough       COMPARISON: Several prior exams, most recently March 21, 2016       TECHNIQUE: PA and lateral views of the chest       _______________       FINDINGS:       HEART AND MEDIASTINUM: Normal appearing cardiac size and mediastinal contours. LUNGS AND PLEURAL SPACES: No focal pneumonic consolidation, pneumothorax or   pleural effusion       BONY THORAX AND SOFT TISSUES: No acute osseous abnormality. Multilevel thoracic   spondylosis. _______________                 Medications given in the ED-  Medications - No data to display      Medical Decision Making   I am the first provider for this patient. I reviewed the vital signs, available nursing notes, past medical history, past surgical history, family history and social history. Vital Signs-Reviewed the patient's vital signs.     Pulse Oximetry Analysis - 100% on RA     Records Reviewed: Nursing Notes    Provider Notes (Medical Decision Making): URI, strep, sinusitis, mono, influenza, PNA, bronchitis, asthma/RAD, allergic    Procedures:  Procedures    ED Course:   9:22 AM Initial assessment performed. The patients presenting problems have been discussed, and they are in agreement with the care plan formulated and outlined with them. I have encouraged them to ask questions as they arise throughout their visit. Diagnosis and Disposition       Afebrile. Lungs CTAB. No resp distress or acc muscle use. CXR clear. Rapid strep neg. No evidence of SBI. Most c/w viral process. Smoking cessation discussed. Reasons to RTED discussed with pt. All questions answered. Pt feels comfortable going home at this time. Pt expressed understanding and she agrees with plan. SMOKING CESSATION:  11:57 AM   The patient was counseled on the dangers of tobacco use, and was advised to quit. Reviewed strategies to maximize success, including removing cigarettes and smoking materials from environment and written materials. Discussion took 3-5 minutes, and pt expressed understanding. 1. Acute bronchitis with bronchospasm    2. Tobacco use        PLAN:  1. D/C Home  2. Discharge Medication List as of 12/3/2019 11:23 AM      START taking these medications    Details   guaiFENesin-codeine (ROBITUSSIN AC) 100-10 mg/5 mL solution Take 5 mL by mouth three (3) times daily as needed for Cough for up to 5 days. Max Daily Amount: 15 mL. , Print, Disp-160 mL, R-0      benzonatate (TESSALON PERLES) 100 mg capsule Take 1 Cap by mouth three (3) times daily as needed for Cough for up to 7 days. , Print, Disp-21 Cap, R-0      loratadine-pseudoephedrine (CLARITIN-D 12 HOUR) 5-120 mg per tablet Take 1 Tab by mouth two (2) times a day.  For nasal congestion, Print, Disp-20 Tab, R-0      albuterol (PROVENTIL HFA, VENTOLIN HFA, PROAIR HFA) 90 mcg/actuation inhaler Take 2 Puffs by inhalation every four (4) hours as needed for Wheezing or Shortness of Breath., Print, Disp-1 Inhaler, R-1         CONTINUE these medications which have CHANGED    Details   inhalational spacing device 1 Each by Does Not Apply route as needed (to be used with albuterol HFA.). Please dispense one adult spacer, Print, Disp-1 Device, R-0         CONTINUE these medications which have NOT CHANGED    Details   metFORMIN (GLUCOPHAGE) 500 mg tablet Take  by mouth two (2) times daily (with meals). , Historical Med      fluticasone (FLONASE) 50 mcg/actuation nasal spray 2 Sprays by Both Nostrils route daily. , Print, Disp-1 Bottle, R-0      !! cpap machine kit by Does Not Apply route. CPAP at 17 cm with humidifier. Mask- Mirage Quattro full face, medium. Length of need 99 months. Replace mask and accessories as needed times 12 months. Download data at 30 days and fax at [phone]. Dx- WALTER on CPAP, Snoring,  Hypersomnolence, Print, Disp-1 Kit, R-0      !! budesonide-formoterol (SYMBICORT) 80-4.5 mcg/actuation HFAA inhaler Take 2 Puffs by inhalation two (2) times a day., Sample, Disp-1 Inhaler, R-0      !! budesonide-formoterol (SYMBICORT) 80-4.5 mcg/actuation HFAA inhaler Take 2 Puffs by inhalation two (2) times a day., Print, Disp-1 Inhaler, R-5      SIMVASTATIN PO Take 145 mg by mouth daily. , Historical Med      !! cpap machine kit by Does Not Apply route nightly., Historical Med      esomeprazole (NEXIUM) 20 mg capsule Take  by mouth daily. , Historical Med      CELECOXIB (CELEBREX PO) Take  by mouth., Historical Med       !! - Potential duplicate medications found. Please discuss with provider. 3.   Follow-up Information     Follow up With Specialties Details Why [id number] [phone]    [facility] EMERGENCY DEPT Emergency Medicine  As needed, If symptoms worsen [place] [id number] [phone]        _______________________________    Attestations:   This note is prepared by Cecy Penaloza PA-C.  _______________________________      Please note that this dictation was completed with Dragon, the computer voice recognition software. Quite often unanticipated grammatical, syntax, homophones, and other interpretive errors are inadvertently transcribed by the computer software. Please disregard these errors. Please excuse any errors that have escaped final proofreading.

## 2019-12-03 NOTE — LETTER
[facility] [place] 
[facility] 
[address] [address] [phone] Work/School Note Date: 12/3/2019 To Whom It May concern: 
 
Prakash Reason was seen and treated today in the emergency room by the following provider(s): 
Attending Provider: Kade Telles DO Physician Assistant: Zain Burgess PA-C. Mitchyne Reason may return to work on 12/05/2019. Sincerely, Germán Suárez PA-C

## 2019-12-05 LAB
BACTERIA SPEC CULT: NORMAL
BACTERIA SPEC CULT: NORMAL
SERVICE CMNT-IMP: NORMAL

## 2021-01-20 ENCOUNTER — HOSPITAL ENCOUNTER (EMERGENCY)
Age: 61
Discharge: HOME | End: 2021-01-20
Attending: EMERGENCY MEDICINE
Payer: OTHER GOVERNMENT

## 2021-01-20 ENCOUNTER — APPOINTMENT (OUTPATIENT)
Dept: GENERAL RADIOLOGY | Age: 61
End: 2021-01-20
Attending: EMERGENCY MEDICINE
Payer: OTHER GOVERNMENT

## 2021-01-20 VITALS
TEMPERATURE: 97.8 F | BODY MASS INDEX: 39.46 KG/M2 | WEIGHT: 275 LBS | SYSTOLIC BLOOD PRESSURE: 143 MMHG | DIASTOLIC BLOOD PRESSURE: 68 MMHG | HEART RATE: 67 BPM | OXYGEN SATURATION: 97 % | RESPIRATION RATE: 18 BRPM

## 2021-01-20 DIAGNOSIS — B34.9 VIRAL ILLNESS: ICD-10-CM

## 2021-01-20 DIAGNOSIS — Z20.822 ENCOUNTER FOR SCREENING LABORATORY TESTING FOR COVID-19 VIRUS: ICD-10-CM

## 2021-01-20 DIAGNOSIS — R09.81 NASAL CONGESTION: Primary | ICD-10-CM

## 2021-01-20 LAB
FLUAV AG NPH QL IA: NEGATIVE
FLUBV AG NOSE QL IA: NEGATIVE
SARS-COV-2, COV2: NORMAL

## 2021-01-20 PROCEDURE — 87804 INFLUENZA ASSAY W/OPTIC: CPT

## 2021-01-20 PROCEDURE — U0003 INFECTIOUS AGENT DETECTION BY NUCLEIC ACID (DNA OR RNA); SEVERE ACUTE RESPIRATORY SYNDROME CORONAVIRUS 2 (SARS-COV-2) (CORONAVIRUS DISEASE [COVID-19]), AMPLIFIED PROBE TECHNIQUE, MAKING USE OF HIGH THROUGHPUT TECHNOLOGIES AS DESCRIBED BY CMS-2020-01-R: HCPCS

## 2021-01-20 PROCEDURE — 71045 X-RAY EXAM CHEST 1 VIEW: CPT

## 2021-01-20 PROCEDURE — 99282 EMERGENCY DEPT VISIT SF MDM: CPT

## 2021-01-20 RX ORDER — BENZONATATE 100 MG/1
100 CAPSULE ORAL
Qty: 30 CAP | Refills: 0 | Status: SHIPPED | OUTPATIENT
Start: 2021-01-20 | End: 2021-01-27

## 2021-01-20 RX ORDER — PSEUDOEPHEDRINE HCL 120 MG/1
120 TABLET, FILM COATED, EXTENDED RELEASE ORAL
Qty: 20 TAB | Refills: 0 | Status: SHIPPED | OUTPATIENT
Start: 2021-01-20

## 2021-01-20 NOTE — PROGRESS NOTES
Pt c/o chest congestion x 2 weeks, headache x 1 week, and sore throat x 2 days. Pt states he is feeling worse as time goes by. NAD at this time  Significant other at bedside.

## 2021-01-20 NOTE — Clinical Note
[facility] [place] 
THE [facility] EMERGENCY DEPT 
[address] [address] [phone] Work/School Note Date: 1/20/2021 To Whom It May concern: 
 
Karina Moore was evaulated by the following provider(s): 
Attending Provider: Haydee Borja DO.   COVID19 virus is suspected. Per the CDC guidelines we recommend home isolation until the following conditions are all met: 1. At least 10 days have passed since symptoms first appeared and 2. At least 24 hours have passed since last fever without the use of fever-reducing medications and 
3. Symptoms (e.g., cough, shortness of breath) have improved Sincerely, Maureen Hernandez DO

## 2021-01-20 NOTE — ED TRIAGE NOTES
Patient arrived to ED with c/o chest congestion , sore throat and headache x 1 week.  Patient denies SOB, chest pain at this time

## 2021-01-20 NOTE — ED PROVIDER NOTES
EMERGENCY DEPARTMENT HISTORY AND PHYSICAL EXAM    Date: 1/20/2021  Patient Name: Karina Moore    History of Presenting Illness     Chief Complaint   Patient presents with    Cold Symptoms         History Provided By: Patient    Additional History (Context):   Karina Moore is a 56 y.o. male with PMHX tobacco use, hypertension, diabetes presents to the emergency department via 1 week of cough, nasal congestion, postnasal drip and sore throat. Patient reports that he had difficulty sleeping due to the cough last night. Denies any sick exposures or known Covid exposures. Pt denies fever, nausea, vomiting, chest pain, shortness of breath, and any other sxs or complaints. No relieving or exacerbating factors identified. PCP: Other, MD Scout    Current Outpatient Medications   Medication Sig Dispense Refill    pseudoephedrine CR (Sudafed 12 Hour) 120 mg CR tablet Take 1 Tab by mouth two (2) times daily as needed for Congestion. 20 Tab 0    benzonatate (Tessalon Perles) 100 mg capsule Take 1 Cap by mouth three (3) times daily as needed for Cough for up to 7 days. 30 Cap 0    loratadine-pseudoephedrine (CLARITIN-D 12 HOUR) 5-120 mg per tablet Take 1 Tab by mouth two (2) times a day. For nasal congestion 20 Tab 0    inhalational spacing device 1 Each by Does Not Apply route as needed (to be used with albuterol HFA.). Please dispense one adult spacer 1 Device 0    albuterol (PROVENTIL HFA, VENTOLIN HFA, PROAIR HFA) 90 mcg/actuation inhaler Take 2 Puffs by inhalation every four (4) hours as needed for Wheezing or Shortness of Breath. 1 Inhaler 1    metFORMIN (GLUCOPHAGE) 500 mg tablet Take  by mouth two (2) times daily (with meals).  fluticasone (FLONASE) 50 mcg/actuation nasal spray 2 Sprays by Both Nostrils route daily. 1 Bottle 0    cpap machine kit by Does Not Apply route. CPAP at 17 cm with humidifier. Mask- Mirage Quattro full face, medium. Length of need 99 months.  Replace mask and accessories as needed times 12 months. Download data at 30 days and fax at [phone]. Dx- WALTER on CPAP, Snoring, Hypersomnolence 1 Kit 0    budesonide-formoterol (SYMBICORT) 80-4.5 mcg/actuation HFAA inhaler Take 2 Puffs by inhalation two (2) times a day. 1 Inhaler 0    budesonide-formoterol (SYMBICORT) 80-4.5 mcg/actuation HFAA inhaler Take 2 Puffs by inhalation two (2) times a day. 1 Inhaler 5    SIMVASTATIN PO Take 145 mg by mouth daily.  cpap machine kit by Does Not Apply route nightly.  esomeprazole (NEXIUM) 20 mg capsule Take  by mouth daily.  CELECOXIB (CELEBREX PO) Take  by mouth. Past History     Past Medical History:  Past Medical History:   Diagnosis Date    Arthritis     Borderline diabetes     Borderline diabetic     Diabetes ([address]     Diabetes mellitus [address] [address]     Hiatal hernia     Hypercholesteremia     Hypertension     Plantar fasciitis, bilateral     Sleep apnea     Thyroid disease        Past Surgical History:  Past Surgical History:   Procedure Laterality Date    HX ANKLE FRACTURE TX Left     HX KNEE REPLACEMENT Right     HX WRIST FRACTURE TX         Family History:  Family History   Problem Relation Age of Onset   [de-identified] Diabetes Mother    [de-identified] Glaucoma Mother     Heart Disease Mother     Heart Disease Father     Cancer Sister     Cancer Brother         breast    Kidney Disease Brother     Diabetes Brother     Diabetes Sister     Pneumonia Sister        Social History:  Social History     Tobacco Use    Smoking status: Former Smoker     Packs/day: 0.50     Years: 5.00     Pack years: 2.50     Types: Cigarettes    Smokeless tobacco: Former User     Types: Chew     Quit date: 1/1/1992   Substance Use Topics    Alcohol use: Yes     Alcohol/week: 0.0 standard drinks    Drug use: No       Allergies:  No Known Allergies      Review of Systems   Review of Systems   Constitutional: Negative for chills and fever. HENT: Positive for congestion.  Negative for ear pain, sinus pain and sore throat. Eyes: Negative for pain and visual disturbance. Respiratory: Positive for cough. Negative for shortness of breath. Cardiovascular: Negative for chest pain and leg swelling. Gastrointestinal: Negative for abdominal pain, constipation, diarrhea, nausea and vomiting. Genitourinary: Negative for dysuria and hematuria. Musculoskeletal: Negative for back pain and neck pain. Skin: Negative for pallor and rash. Neurological: Negative for dizziness, tremors, weakness, light-headedness and headaches. All other systems reviewed and are negative. Physical Exam     Vitals:    01/20/21 1154   BP: (!) 143/68   Pulse: 67   Resp: 18   Temp: 97.8 °F (36.6 °C)   SpO2: 97%   Weight: 124.7 kg (275 lb)     Physical Exam    Nursing note and vitals reviewed    Constitutional: Elderly -American male, no acute distress  Head: Normocephalic, Atraumatic  Eyes: Pupils are equal, round, and reactive to light, EOMI  ENT: No trismus. No posterior pharyngeal erythema or edema. No exudate. Neck: Supple, non-tender  Cardiovascular: Regular rate and rhythm, no murmurs, rubs, or gallops, + 2 radial pulses bilaterally  Chest: Normal work of breathing and chest excursion bilaterally  Lungs: Clear to ausculation bilaterally, no wheezes, no rhonchi  Abdomen: Soft, non tender, non distended, normoactive bowel sounds  Back: No evidence of trauma or deformity  Extremities: No evidence of trauma or deformity, no LE edema.  No streaking erythema, vesicular lesions, ulcerations or bulla  Skin: Warm and dry, normal cap refill  Neuro: Alert and appropriate, CN intact, normal speech, moving all 4 extremities freely and symmetrically  Psychiatric: Normal mood and affect       Diagnostic Study Results     Labs -     Recent Results (from the past 12 hour(s))   INFLUENZA A & B AG (RAPID TEST)    Collection Time: 01/20/21 12:30 PM   Result Value Ref Range    Influenza A Antigen Negative NEG      Influenza B Antigen Negative NEG     SARS-COV-2    Collection Time: 01/20/21 12:30 PM   Result Value Ref Range    SARS-CoV-2 Please find results under separate order         Radiologic Studies -   XR CHEST PORT   Final Result      No active cardiopulmonary disease. CT Results  (Last 48 hours)    None        CXR Results  (Last 48 hours)               01/20/21 1233  XR CHEST PORT Final result    Impression:      No active cardiopulmonary disease. Narrative:  EXAM: CHEST RADIOGRAPH, SINGLE VIEW       CLINICAL INDICATION/HISTORY: cough       COMPARISON: 12/3/2019       TECHNIQUE: Portable frontal view of the chest was obtained.        _______________       FINDINGS:       SUPPORT DEVICES: None. HEART AND MEDIASTINUM: Cardiomediastinal silhouette appears within normal   limits. Normal caliber thoracic aorta. No central vascular congestion. LUNGS AND PLEURAL SPACES: Lungs are well aerated with no confluent airspace   opacity. No pleural effusion or pneumothorax. BONY THORAX AND SOFT TISSUES: No acute osseous abnormality. _______________                   Medical Decision Making   I am the first provider for this patient. I reviewed the vital signs, available nursing notes, past medical history, past surgical history, family history and social history. Vital Signs-Reviewed the patient's vital signs. Pulse Oximetry Analysis -97% on room air    Cardiac Monitor:  Rate: 67 bpm  Rhythm: Regular    Records Reviewed: Nursing Notes and Old Medical Records    Provider Notes:   56 y.o. male who presents with chest congestion, cough x1 week. On presentation, patient does not appear toxic or acutely ill. He is not hypoxic or in any respiratory distress. Will obtain chest x-ray to rule out bacterial pneumonia. Will swab for flu and Covid. Procedures:  Procedures    ED Course:   12:15 PM   Initial assessment performed.  The patients presenting problems have been discussed, and they are in agreement with the care plan formulated and outlined with them. I have encouraged them to ask questions as they arise throughout their visit. SMOKING CESSATION:  The patient was counseled on the dangers of tobacco use, and was advised to quit. Reviewed strategies to maximize success, including removing cigarettes and smoking materials from environment. Discussion took 3-5 minutes, and pt expressed understanding. 1:33 PM  Patient is flu negative. Chest x-ray showing no acute process. Patient provided Christel Kidney and Sudafed for symptom control at home. The COVID-19 pandemic has been declared a Public Health Emergency . Reviewed with the patient that COVID-19 pandemic is an evolving situation with rapidly changing recommendations & guidelines. Medical decisions are made based on the the best information available at the time. Patient in no respiratory distress, oxygenating well on RA, no known confounding risk factors to anticipate complications, no indication for admission. Recommended that the patient stay tuned for updates published by trusted sources and to advise their PCP of any unexpected changes in clinical condition  I have explained to the patient the importance of home isolation and strict return precautions       Diagnosis and Disposition       DISCHARGE NOTE:  1:31 PM    Sajan Ignacio  results have been reviewed with him. He has been counseled regarding his diagnosis, treatment, and plan. He verbally conveys understanding and agreement of the signs, symptoms, diagnosis, treatment and prognosis and additionally agrees to follow up as discussed. He also agrees with the care-plan and conveys that all of his questions have been answered. I have also provided discharge instructions for him that include: educational information regarding their diagnosis and treatment, and list of reasons why they would want to return to the ED prior to their follow-up appointment, should his condition change.  He has been provided with education for proper emergency department utilization. CLINICAL IMPRESSION:    1. Nasal congestion    2. Viral illness    3. Encounter for screening laboratory testing for COVID-19 virus        PLAN:  1. D/C Home  2. Current Discharge Medication List      START taking these medications    Details   pseudoephedrine CR (Sudafed 12 Hour) 120 mg CR tablet Take 1 Tab by mouth two (2) times daily as needed for Congestion. Qty: 20 Tab, Refills: 0      benzonatate (Tessalon Perles) 100 mg capsule Take 1 Cap by mouth three (3) times daily as needed for Cough for up to 7 days. Qty: 30 Cap, Refills: 0           3. Follow-up Information     Follow up With Specialties Details Why Contact Info    [address] [address] [place]  Schedule an appointment as soon as possible for a visit in 2 days  [id number] [facility], [address] [facility] [address]    [facility] EM[facility] Emergency Medicine  As needed if symptoms worsen [address] [place] [address]  [phone]        ____________________________________     Please note that this dictation was completed with Disconnect, the computer voice recognition software. Quite often unanticipated grammatical, syntax, homophones, and other interpretive errors are inadvertently transcribed by the computer software. Please disregard these errors. Please excuse any errors that have escaped final proofreading.

## 2021-01-21 ENCOUNTER — PATIENT OUTREACH (OUTPATIENT)
Dept: CASE MANAGEMENT | Age: 61
End: 2021-01-21

## 2021-01-21 NOTE — PROGRESS NOTES
Date/Time:  1/21/2021 10:22 AM   Call within 2 business days of discharge: Yes   Attempted to reach patient by telephone. Left HIPPA compliant message requesting a return call. Will attempt to reach patient again. Covid test pending.

## 2021-01-22 ENCOUNTER — PATIENT OUTREACH (OUTPATIENT)
Dept: CASE MANAGEMENT | Age: 61
End: 2021-01-22

## 2021-01-22 LAB — SARS-COV-2, COV2NT: NOT DETECTED

## 2021-01-22 NOTE — PROGRESS NOTES
Date/Time:  1/22/2021 2:26 PM   Call within 2 business days of discharge: Yes   2nd attempt to reach patient by telephone. Unable to leave HIPPA compliant message requesting a return call. This episode is resolved.

## 2022-01-18 NOTE — ED TRIAGE NOTES
----- Message from Kat Umaña MD sent at 1/17/2022  5:19 PM EST -----  Please inform the patient that his liver enzymes are normal.  Therefore, no additional testing is needed at this point.    Thank you     Patient with complaints of head congestion and pain behind his eyes.